# Patient Record
Sex: FEMALE | Race: WHITE | Employment: OTHER | URBAN - METROPOLITAN AREA
[De-identification: names, ages, dates, MRNs, and addresses within clinical notes are randomized per-mention and may not be internally consistent; named-entity substitution may affect disease eponyms.]

---

## 2017-01-30 ENCOUNTER — OFFICE VISIT (OUTPATIENT)
Dept: FAMILY MEDICINE CLINIC | Facility: CLINIC | Age: 77
End: 2017-01-30

## 2017-01-30 ENCOUNTER — HOSPITAL ENCOUNTER (OUTPATIENT)
Dept: GENERAL RADIOLOGY | Age: 77
Discharge: HOME | End: 2017-01-30
Attending: FAMILY MEDICINE
Payer: COMMERCIAL

## 2017-01-30 ENCOUNTER — HOSPITAL ENCOUNTER (OUTPATIENT)
Dept: CV DIAGNOSTICS | Age: 77
Discharge: HOME | End: 2017-01-30
Attending: FAMILY MEDICINE
Payer: COMMERCIAL

## 2017-01-30 ENCOUNTER — APPOINTMENT (OUTPATIENT)
Dept: LAB | Age: 77
End: 2017-01-30
Attending: FAMILY MEDICINE
Payer: COMMERCIAL

## 2017-01-30 VITALS
HEART RATE: 96 BPM | WEIGHT: 166.81 LBS | DIASTOLIC BLOOD PRESSURE: 72 MMHG | SYSTOLIC BLOOD PRESSURE: 130 MMHG | TEMPERATURE: 97 F | HEIGHT: 68 IN | BODY MASS INDEX: 25.28 KG/M2

## 2017-01-30 DIAGNOSIS — M54.50 ACUTE BILATERAL LOW BACK PAIN WITHOUT SCIATICA: ICD-10-CM

## 2017-01-30 DIAGNOSIS — F32.89 OTHER DEPRESSION: ICD-10-CM

## 2017-01-30 DIAGNOSIS — R55 VASOVAGAL SYNCOPE: Primary | ICD-10-CM

## 2017-01-30 DIAGNOSIS — E03.9 HYPOTHYROIDISM, UNSPECIFIED TYPE: ICD-10-CM

## 2017-01-30 DIAGNOSIS — R55 VASOVAGAL SYNCOPE: ICD-10-CM

## 2017-01-30 PROBLEM — A08.4 GASTROENTERITIS AND COLITIS, VIRAL: Status: ACTIVE | Noted: 2017-01-17

## 2017-01-30 LAB
ALBUMIN SERPL-MCNC: 3.7 G/DL (ref 3.5–4.8)
ALP LIVER SERPL-CCNC: 157 U/L (ref 55–142)
ALT SERPL-CCNC: 37 U/L (ref 14–54)
AST SERPL-CCNC: 20 U/L (ref 15–41)
BASOPHILS # BLD AUTO: 0.04 X10(3) UL (ref 0–0.1)
BASOPHILS NFR BLD AUTO: 0.6 %
BILIRUB SERPL-MCNC: 0.5 MG/DL (ref 0.1–2)
BILIRUB UR QL STRIP.AUTO: NEGATIVE
BUN BLD-MCNC: 15 MG/DL (ref 8–20)
CALCIUM BLD-MCNC: 9.2 MG/DL (ref 8.3–10.3)
CHLORIDE: 102 MMOL/L (ref 101–111)
CO2: 30 MMOL/L (ref 22–32)
COLOR UR AUTO: YELLOW
CREAT BLD-MCNC: 1.05 MG/DL (ref 0.55–1.02)
EOSINOPHIL # BLD AUTO: 0.06 X10(3) UL (ref 0–0.3)
EOSINOPHIL NFR BLD AUTO: 0.9 %
ERYTHROCYTE [DISTWIDTH] IN BLOOD BY AUTOMATED COUNT: 13.2 % (ref 11.5–16)
FREE T4: 0.9 NG/DL (ref 0.9–1.8)
GLUCOSE BLD-MCNC: 111 MG/DL (ref 70–99)
GLUCOSE UR STRIP.AUTO-MCNC: NEGATIVE MG/DL
HCT VFR BLD AUTO: 42.9 % (ref 34–50)
HGB BLD-MCNC: 13.6 G/DL (ref 12–16)
IMMATURE GRANULOCYTE COUNT: 0.02 X10(3) UL (ref 0–1)
IMMATURE GRANULOCYTE RATIO %: 0.3 %
KETONES UR STRIP.AUTO-MCNC: NEGATIVE MG/DL
LYMPHOCYTES # BLD AUTO: 2.32 X10(3) UL (ref 0.9–4)
LYMPHOCYTES NFR BLD AUTO: 35.7 %
M PROTEIN MFR SERPL ELPH: 7.6 G/DL (ref 6.1–8.3)
MCH RBC QN AUTO: 28.9 PG (ref 27–33.2)
MCHC RBC AUTO-ENTMCNC: 31.7 G/DL (ref 31–37)
MCV RBC AUTO: 91.1 FL (ref 81–100)
MONOCYTES # BLD AUTO: 0.38 X10(3) UL (ref 0.1–0.6)
MONOCYTES NFR BLD AUTO: 5.8 %
NEUTROPHIL ABS PRELIM: 3.68 X10 (3) UL (ref 1.3–6.7)
NEUTROPHILS # BLD AUTO: 3.68 X10(3) UL (ref 1.3–6.7)
NEUTROPHILS NFR BLD AUTO: 56.7 %
NITRITE UR QL STRIP.AUTO: POSITIVE
PH UR STRIP.AUTO: 5 [PH] (ref 4.5–8)
PLATELET # BLD AUTO: 266 10(3)UL (ref 150–450)
POTASSIUM SERPL-SCNC: 4.2 MMOL/L (ref 3.6–5.1)
PROT UR STRIP.AUTO-MCNC: NEGATIVE MG/DL
RBC # BLD AUTO: 4.71 X10(6)UL (ref 3.8–5.1)
RED CELL DISTRIBUTION WIDTH-SD: 43.8 FL (ref 35.1–46.3)
SODIUM SERPL-SCNC: 139 MMOL/L (ref 136–144)
SP GR UR STRIP.AUTO: 1.02 (ref 1–1.03)
TSI SER-ACNC: 6.91 MIU/ML (ref 0.35–5.5)
UROBILINOGEN UR STRIP.AUTO-MCNC: <2 MG/DL
WBC # BLD AUTO: 6.5 X10(3) UL (ref 4–13)

## 2017-01-30 PROCEDURE — 87184 SC STD DISK METHOD PER PLATE: CPT | Performed by: FAMILY MEDICINE

## 2017-01-30 PROCEDURE — 85025 COMPLETE CBC W/AUTO DIFF WBC: CPT | Performed by: FAMILY MEDICINE

## 2017-01-30 PROCEDURE — 93225 XTRNL ECG REC<48 HRS REC: CPT

## 2017-01-30 PROCEDURE — 87088 URINE BACTERIA CULTURE: CPT | Performed by: FAMILY MEDICINE

## 2017-01-30 PROCEDURE — 84443 ASSAY THYROID STIM HORMONE: CPT | Performed by: FAMILY MEDICINE

## 2017-01-30 PROCEDURE — 87186 SC STD MICRODIL/AGAR DIL: CPT | Performed by: FAMILY MEDICINE

## 2017-01-30 PROCEDURE — 84439 ASSAY OF FREE THYROXINE: CPT | Performed by: FAMILY MEDICINE

## 2017-01-30 PROCEDURE — 93000 ELECTROCARDIOGRAM COMPLETE: CPT | Performed by: FAMILY MEDICINE

## 2017-01-30 PROCEDURE — 87086 URINE CULTURE/COLONY COUNT: CPT | Performed by: FAMILY MEDICINE

## 2017-01-30 PROCEDURE — 93227 XTRNL ECG REC<48 HR R&I: CPT | Performed by: INTERNAL MEDICINE

## 2017-01-30 PROCEDURE — 80053 COMPREHEN METABOLIC PANEL: CPT | Performed by: FAMILY MEDICINE

## 2017-01-30 PROCEDURE — 99215 OFFICE O/P EST HI 40 MIN: CPT | Performed by: FAMILY MEDICINE

## 2017-01-30 PROCEDURE — 72110 X-RAY EXAM L-2 SPINE 4/>VWS: CPT

## 2017-01-30 PROCEDURE — 81001 URINALYSIS AUTO W/SCOPE: CPT | Performed by: FAMILY MEDICINE

## 2017-01-30 RX ORDER — AMOXICILLIN AND CLAVULANATE POTASSIUM 875; 125 MG/1; MG/1
TABLET, FILM COATED ORAL
Refills: 0 | COMMUNITY
Start: 2017-01-06 | End: 2017-03-03 | Stop reason: ALTCHOICE

## 2017-01-30 RX ORDER — MIRTAZAPINE 15 MG/1
15 TABLET, FILM COATED ORAL DAILY
COMMUNITY
Start: 2006-12-03 | End: 2017-05-08

## 2017-01-30 RX ORDER — CLONAZEPAM 0.5 MG/1
TABLET ORAL
COMMUNITY
Start: 2006-12-04 | End: 2017-05-15

## 2017-01-30 RX ORDER — LEVOTHYROXINE AND LIOTHYRONINE 19; 4.5 UG/1; UG/1
1 TABLET ORAL EVERY OTHER DAY
COMMUNITY
Start: 2016-11-30

## 2017-01-30 RX ORDER — LEVOTHYROXINE AND LIOTHYRONINE 38; 9 UG/1; UG/1
1 TABLET ORAL DAILY
COMMUNITY
Start: 2016-11-30

## 2017-01-30 RX ORDER — CIPROFLOXACIN 500 MG/1
TABLET, FILM COATED ORAL
Refills: 0 | COMMUNITY
Start: 2017-01-03 | End: 2017-01-30 | Stop reason: ALTCHOICE

## 2017-01-30 RX ORDER — BUSPIRONE HYDROCHLORIDE 10 MG/1
1 TABLET ORAL
COMMUNITY
Start: 2006-12-03 | End: 2017-02-07

## 2017-01-30 RX ORDER — BLACK COHOSH ROOT EXTRACT 80 MG
1 CAPSULE ORAL DAILY
COMMUNITY
Start: 2013-10-28

## 2017-01-30 RX ORDER — INFLUENZA A VIRUS A/MICHIGAN/45/2015 X-275 (H1N1) ANTIGEN (FORMALDEHYDE INACTIVATED), INFLUENZA A VIRUS A/HONG KONG/4801/2014 X-263B (H3N2) ANTIGEN (FORMALDEHYDE INACTIVATED), INFLUENZA B VIRUS B/PHUKET/3073/2013 ANTIGEN (FORMALDEHYDE INACTIVATED), AND INFLUENZA B VIRUS B/BRISBANE/60/2008 ANTIGEN (FORMALDEHYDE INACTIVATED) 15; 15; 15; 15 UG/.5ML; UG/.5ML; UG/.5ML; UG/.5ML
INJECTION, SUSPENSION INTRAMUSCULAR
Refills: 0 | COMMUNITY
Start: 2016-12-12

## 2017-01-30 NOTE — PATIENT INSTRUCTIONS
Decrease meds as instructed. Call [facility] for echocardiogram tomorrow. Call Dr. Chey Lopez to set up 30 day holter. 24 hour holitor today. Lumbar films. Cbc, cmp, ck, ua, tsh, t4.

## 2017-01-30 NOTE — H&P
Orthostatic Vitals:    Lyin/82     84  Sittin/66     88  Standin/60    88    Juliana Washington, 2017, 10:55 AM

## 2017-01-30 NOTE — PROGRESS NOTES
[facility] [place]  PROGRESS NOTE        HPI:   This is a 68year old female coming in for Patient presents with:  ER F/U: Fainting and unresponsive     HPI  Pt was driving with her   adn was ggetting up in the am to take thyroid medicati  had his breakfast, and wasn't watching as much,   Noted that she was pouring cereal than she usually used. She was trying to close the lid on the box.  And trying to pop it open, and the cereal was all over the counter, and pilbox was fill of cerea Diarrhea  Current Meds:    Current Outpatient Prescriptions:  BusPIRone HCl 10 MG Oral Tab Take 1 tablet by mouth TID with food. Disp:  Rfl:    ClonazePAM (KLONOPIN) 0.5 MG Oral Tab Take 0.5 tablets by mouth 2 (two) times daily.   Disp:  Rfl:    Lactobacil from the following:    Height as of this encounter: 68\". Weight as of this encounter: 166 lb 12.8 oz. Vital signs reviewed. Physical Exam   Constitutional: She appears well-developed and well-nourished. She appears lethargic.    HENT:   Head: Normoce [E]  - CMP  - TSH W Reflex To Free T4 [E]  - UA/M With Culture Reflex [E]  - CBC W/ DIFFERENTIAL; Future  - CBC W/ DIFFERENTIAL    2.  Acute bilateral low back pain without sciatica  X-rays positive for a lumbar compression fracture is also seen by radiolog this encounter     65 minutes spent in face to face counseling and coordination of care. Patients questions were answered and patient seemed comfortable with plan of care. Outcome: Parent verbalizes understanding.  Parent is notified to call with any

## 2017-01-31 ENCOUNTER — HOSPITAL ENCOUNTER (OUTPATIENT)
Dept: CV DIAGNOSTICS | Age: 77
Discharge: HOME | End: 2017-01-31
Attending: FAMILY MEDICINE
Payer: COMMERCIAL

## 2017-01-31 ENCOUNTER — TELEPHONE (OUTPATIENT)
Dept: FAMILY MEDICINE CLINIC | Facility: CLINIC | Age: 77
End: 2017-01-31

## 2017-01-31 DIAGNOSIS — E03.9 HYPOTHYROIDISM, UNSPECIFIED TYPE: Primary | ICD-10-CM

## 2017-01-31 RX ORDER — CEPHALEXIN 500 MG/1
500 CAPSULE ORAL 3 TIMES DAILY
Qty: 30 CAPSULE | Refills: 0 | Status: SHIPPED | OUTPATIENT
Start: 2017-01-31 | End: 2017-03-25 | Stop reason: ALTCHOICE

## 2017-01-31 RX ORDER — CEPHALEXIN 500 MG/1
500 CAPSULE ORAL 3 TIMES DAILY
COMMUNITY
End: 2017-01-31

## 2017-02-01 NOTE — TELEPHONE ENCOUNTER
----- Message from Josiane Robledo MD sent at 1/31/2017  4:51 PM CST -----  Start keflex 500 mg po tid. Advised to increase fluid intake. Call if increasing pain or vomitting. Cranberry concentrate to help change PH to decrease infection risk.   Void af

## 2017-02-01 NOTE — TELEPHONE ENCOUNTER
Patient and son Kaylie Adventism notified of results and recommendations. Keflex script to [facility] per patient request. [place] Thyroid meds updated in chart with current dose per son Kaylie Virk. Please advise on new dose of armour thyroid.  Erica Washington, 01/31/2017, 6:12 PM

## 2017-02-01 NOTE — TELEPHONE ENCOUNTER
Script sent to [email or web address]   Recommend   Continue 60 mg daily. 30 mg every other day. Recheck tsh in 8  Weeks.    Jessika Sosa, 01/31/2017, 10:05 PM

## 2017-02-03 ENCOUNTER — TELEPHONE (OUTPATIENT)
Dept: FAMILY MEDICINE CLINIC | Facility: CLINIC | Age: 77
End: 2017-02-03

## 2017-02-03 DIAGNOSIS — N39.0 URINARY TRACT INFECTION, SITE UNSPECIFIED: Primary | ICD-10-CM

## 2017-02-03 NOTE — TELEPHONE ENCOUNTER
Patient and her  notified of results and recommendations and expressed understanding. Order in for recheck of urine.  Pio Nevaeh Felix, 02/03/2017, 2:23 PM

## 2017-02-03 NOTE — TELEPHONE ENCOUNTER
Patient and her  advised of Dr. Aron Patel recommendation on the [place] Thyroid. They both expressed understanding. Med updated in chart. No refills needed at this time. Order in for recheck of bloodwork.  Jaxon Camara Minor, 02/03/2017, 2:18 PM

## 2017-02-03 NOTE — TELEPHONE ENCOUNTER
----- Message from Kong Alegre MD sent at 2/2/2017  4:27 PM CST -----  keflfex should cover. Advised to increase fluid intake. Call if increasing pain or vomitting. Cranberry concentrate to help change PH to decrease infection risk.   Void after int

## 2017-02-06 ENCOUNTER — TELEPHONE (OUTPATIENT)
Dept: FAMILY MEDICINE CLINIC | Facility: CLINIC | Age: 77
End: 2017-02-06

## 2017-02-07 RX ORDER — BUSPIRONE HYDROCHLORIDE 10 MG/1
10 TABLET ORAL 3 TIMES DAILY
Qty: 90 TABLET | Refills: 1 | Status: SHIPPED | OUTPATIENT
Start: 2017-02-07 | End: 2017-04-11

## 2017-02-07 NOTE — TELEPHONE ENCOUNTER
----- Message from Michelle Gaston MD sent at 2/6/2017  3:02 PM CST -----  This actually looks pretty good. How is she feeling? Would like her to follow up early next week to recheck on her.

## 2017-02-07 NOTE — TELEPHONE ENCOUNTER
Regarding Holter Monitor. Patient notified of results and recommendations and expressed understanding. States she has had nausea since starting keflex but no vomiting or diarrhea. Otherwise feels good.  Has f/up appt scheduled with Dr. Renny Bailey for next Tuesday

## 2017-02-14 ENCOUNTER — OFFICE VISIT (OUTPATIENT)
Dept: FAMILY MEDICINE CLINIC | Facility: CLINIC | Age: 77
End: 2017-02-14

## 2017-02-14 VITALS
BODY MASS INDEX: 25.71 KG/M2 | DIASTOLIC BLOOD PRESSURE: 70 MMHG | SYSTOLIC BLOOD PRESSURE: 132 MMHG | HEIGHT: 68 IN | WEIGHT: 169.63 LBS

## 2017-02-14 DIAGNOSIS — F32.89 OTHER DEPRESSION: ICD-10-CM

## 2017-02-14 DIAGNOSIS — F41.1 ANXIETY STATE: ICD-10-CM

## 2017-02-14 DIAGNOSIS — N39.0 URINARY TRACT INFECTION, SITE UNSPECIFIED: Primary | ICD-10-CM

## 2017-02-14 LAB
BILIRUB UR QL STRIP.AUTO: NEGATIVE
CLARITY UR REFRACT.AUTO: CLEAR
COLOR UR AUTO: COLORLESS
GLUCOSE UR STRIP.AUTO-MCNC: NEGATIVE MG/DL
HYALINE CASTS #/AREA URNS AUTO: PRESENT /LPF
KETONES UR STRIP.AUTO-MCNC: NEGATIVE MG/DL
LEUKOCYTE ESTERASE UR QL STRIP.AUTO: NEGATIVE
NITRITE UR QL STRIP.AUTO: NEGATIVE
PH UR STRIP.AUTO: 6 [PH] (ref 4.5–8)
PROT UR STRIP.AUTO-MCNC: NEGATIVE MG/DL
SP GR UR STRIP.AUTO: <1.005 (ref 1–1.03)
UROBILINOGEN UR STRIP.AUTO-MCNC: <2 MG/DL

## 2017-02-14 PROCEDURE — 87086 URINE CULTURE/COLONY COUNT: CPT | Performed by: FAMILY MEDICINE

## 2017-02-14 PROCEDURE — 81001 URINALYSIS AUTO W/SCOPE: CPT | Performed by: FAMILY MEDICINE

## 2017-02-14 PROCEDURE — 99214 OFFICE O/P EST MOD 30 MIN: CPT | Performed by: FAMILY MEDICINE

## 2017-02-14 NOTE — PROGRESS NOTES
[facility] [place]  PROGRESS NOTE        HPI:   This is a 68year old female coming in for Patient presents with: Follow - Up    HPI  Pt with back pain that is 6/10. Using heat. Tylenol helps a bit 2/10. Used heat.   States sometimes forgets and brother  with Factor XI, deficiency,  advised to test and for factor VIII. Smoking Status: Never Smoker                      Alcohol Use: No              Family History:  History reviewed. No pertinent family history.   Allergies:    Alprazolam in soft tissues of limb     Cataract     Benign neoplasm of colon     Abnormal cystoscopy     Depression     Disorder of lipid metabolism     Gastroenteritis and colitis, viral     Esophageal reflux     Hypothyroidism     Disorder of bone and cartilage again       Follow up in 1 month.   tsh in n8 week. Peroxide to ears. Fish oili dialy. Meds & Refills for this Visit:  No prescriptions requested or ordered in this encounter    Outcome: Parent verbalizes understanding.  Parent is notified to call

## 2017-02-16 ENCOUNTER — TELEPHONE (OUTPATIENT)
Dept: FAMILY MEDICINE CLINIC | Facility: CLINIC | Age: 77
End: 2017-02-16

## 2017-02-16 NOTE — TELEPHONE ENCOUNTER
----- Message from Graciela Ugalde MD sent at 2/15/2017  8:53 PM CST -----  Urine looks much better and culture currently negative. Will notify if it grows anything in next few days. Otherwise nothing more from urine standpoint to do. Kiko Gorman

## 2017-02-16 NOTE — TELEPHONE ENCOUNTER
Patient notified of results and recommendations and expressed understanding.     Pooja Washington, 02/16/2017, 9:19 AM

## 2017-03-02 ENCOUNTER — TELEPHONE (OUTPATIENT)
Dept: FAMILY MEDICINE CLINIC | Facility: CLINIC | Age: 77
End: 2017-03-02

## 2017-03-03 NOTE — TELEPHONE ENCOUNTER
Patient notified of recommendations and expressed understanding  Patient has appt on 4/3/17 with Dr. Robby Davenport to f/up  Advised patient we can draw thyroid labs that day at appt  Patient expressed understanding  Removed erroneous urine orders    Luis Washington, 0

## 2017-03-03 NOTE — TELEPHONE ENCOUNTER
Pt was suppose to increase her synthroid and repeat TSH in 8 weeks. Please resolve extra order for urine in orders. No need for labs at this time. Unless symptoms.        Your appointments     Date & Time Appointment Department [facility])    Monday April

## 2017-03-25 ENCOUNTER — OFFICE VISIT (OUTPATIENT)
Dept: FAMILY MEDICINE CLINIC | Facility: CLINIC | Age: 77
End: 2017-03-25

## 2017-03-25 VITALS
TEMPERATURE: 98 F | BODY MASS INDEX: 26 KG/M2 | WEIGHT: 168.81 LBS | SYSTOLIC BLOOD PRESSURE: 144 MMHG | DIASTOLIC BLOOD PRESSURE: 78 MMHG | HEART RATE: 64 BPM

## 2017-03-25 DIAGNOSIS — N30.00 ACUTE CYSTITIS WITHOUT HEMATURIA: ICD-10-CM

## 2017-03-25 DIAGNOSIS — R30.9 PAINFUL URINATION: Primary | ICD-10-CM

## 2017-03-25 LAB
APPEARANCE: CLEAR
BILIRUB UR QL STRIP.AUTO: NEGATIVE
BILIRUBIN: NEGATIVE
CLARITY UR REFRACT.AUTO: CLEAR
COLOR UR AUTO: YELLOW
GLUCOSE (URINE DIPSTICK): NEGATIVE MG/DL
GLUCOSE UR STRIP.AUTO-MCNC: NEGATIVE MG/DL
KETONES (URINE DIPSTICK): NEGATIVE MG/DL
KETONES UR STRIP.AUTO-MCNC: NEGATIVE MG/DL
MULTISTIX LOT#: NORMAL NUMERIC
NITRITE UR QL STRIP.AUTO: POSITIVE
NITRITE, URINE: POSITIVE
PH UR STRIP.AUTO: 7 [PH] (ref 4.5–8)
PH, URINE: 7 (ref 4.5–8)
PROT UR STRIP.AUTO-MCNC: NEGATIVE MG/DL
PROTEIN (URINE DIPSTICK): NEGATIVE MG/DL
SP GR UR STRIP.AUTO: 1.01 (ref 1–1.03)
SPECIFIC GRAVITY: 1.01 (ref 1–1.03)
URINE-COLOR: YELLOW
UROBILINOGEN UR STRIP.AUTO-MCNC: <2 MG/DL
UROBILINOGEN,SEMI-QN: 0.2 MG/DL (ref 0–1.9)

## 2017-03-25 PROCEDURE — 87086 URINE CULTURE/COLONY COUNT: CPT | Performed by: NURSE PRACTITIONER

## 2017-03-25 PROCEDURE — 99214 OFFICE O/P EST MOD 30 MIN: CPT | Performed by: NURSE PRACTITIONER

## 2017-03-25 PROCEDURE — 81001 URINALYSIS AUTO W/SCOPE: CPT | Performed by: NURSE PRACTITIONER

## 2017-03-25 PROCEDURE — 87088 URINE BACTERIA CULTURE: CPT | Performed by: NURSE PRACTITIONER

## 2017-03-25 PROCEDURE — 87186 SC STD MICRODIL/AGAR DIL: CPT | Performed by: NURSE PRACTITIONER

## 2017-03-25 RX ORDER — CEPHALEXIN 500 MG/1
500 CAPSULE ORAL 3 TIMES DAILY
Qty: 30 CAPSULE | Refills: 0 | Status: SHIPPED | OUTPATIENT
Start: 2017-03-25 | End: 2017-04-04

## 2017-03-25 NOTE — PATIENT INSTRUCTIONS
Urinary tract infection cause and prevention discussed. Drink more water, don't hold urine, urinate after intercourse, don't take bubble baths or use scented soaps, don't use powders, keep bowels regular.  Follow up if symptoms persist or if you experience

## 2017-03-25 NOTE — PROGRESS NOTES
Patient ID:   Ana Nails  is a 68year old female    Allergies    Alprazolam                  Comment:Other reaction(s): felt strange, and withdrawl             from stopping.   Avelox  [Moxifloxac*    Nausea only  Levofloxacin            Nausea only appetite  SKIN:denies unusual skin lesions or rashes  HEENT: denies complaints   NECK/THYROID: denies stiffness or pain. LUNGS: denies cough, shortness of breath, or wheezing.   GENITOURINARY: see HPI   GASTROINTESTINAL: Denies constipation diarrhea change

## 2017-03-27 ENCOUNTER — TELEPHONE (OUTPATIENT)
Dept: FAMILY MEDICINE CLINIC | Facility: CLINIC | Age: 77
End: 2017-03-27

## 2017-03-27 NOTE — TELEPHONE ENCOUNTER
----- Message from NEELA Stacy sent at 3/27/2017  8:09 AM CDT -----  Please have pt continue keflex as this bacteria is sensitive to keflex. Please assess how pt is doing.

## 2017-04-03 ENCOUNTER — TELEPHONE (OUTPATIENT)
Dept: FAMILY MEDICINE CLINIC | Facility: CLINIC | Age: 77
End: 2017-04-03

## 2017-04-03 ENCOUNTER — OFFICE VISIT (OUTPATIENT)
Dept: FAMILY MEDICINE CLINIC | Facility: CLINIC | Age: 77
End: 2017-04-03

## 2017-04-03 VITALS
SYSTOLIC BLOOD PRESSURE: 152 MMHG | HEIGHT: 68 IN | BODY MASS INDEX: 25.31 KG/M2 | RESPIRATION RATE: 20 BRPM | HEART RATE: 96 BPM | TEMPERATURE: 98 F | WEIGHT: 167 LBS | DIASTOLIC BLOOD PRESSURE: 78 MMHG

## 2017-04-03 DIAGNOSIS — N39.0 URINARY TRACT INFECTION WITHOUT HEMATURIA, SITE UNSPECIFIED: Primary | ICD-10-CM

## 2017-04-03 DIAGNOSIS — M25.511 CHRONIC RIGHT SHOULDER PAIN: ICD-10-CM

## 2017-04-03 DIAGNOSIS — G89.29 CHRONIC RIGHT SHOULDER PAIN: ICD-10-CM

## 2017-04-03 DIAGNOSIS — E03.9 HYPOTHYROIDISM (ACQUIRED): ICD-10-CM

## 2017-04-03 PROCEDURE — 99214 OFFICE O/P EST MOD 30 MIN: CPT | Performed by: FAMILY MEDICINE

## 2017-04-03 RX ORDER — PAROXETINE 10 MG/1
10 TABLET, FILM COATED ORAL EVERY MORNING
Qty: 30 TABLET | Refills: 3 | Status: SHIPPED | OUTPATIENT
Start: 2017-04-03 | End: 2017-04-25

## 2017-04-03 NOTE — TELEPHONE ENCOUNTER
Patient would like referral to another counselor  Grace Felty (here at [facility]) does not accept Medicare    Bobbi Washington, 04/03/2017, 5:34 PM

## 2017-04-03 NOTE — PROGRESS NOTES
[place] [facility] [place]  PROGRESS NOTE        HPI:   This is a 68year old female coming in for Patient presents with:  Thyroid Problem: started on medication for thyroid, here for a medication follow up    HPI  Pt states she thinks she is doing rath -URINALYSIS WITH CULTURE REFLEX   Result Value Ref Range   Urine Color Yellow Yellow   Clarity Urine Clear Clear   Spec Gravity 1.006 1.001-1.030   Glucose Urine Negative Negative mg/dl   Bilirubin Urine Negative Negative   Ketones Urine Negative Negativ Comment:Other reaction(s): felt strange, and withdrawl             from stopping. Avelox  [Moxifloxac*    Nausea only  Augmentin [Amoxicil*    Diarrhea  Levofloxacin            Nausea only    Comment:Other reaction(s):  Other (See Comments Constitutional: Negative. HENT: Negative. Eyes: Negative. Respiratory: Negative. Cardiovascular: Negative. Gastrointestinal: Negative. Endocrine: Negative.     Genitourinary: Negative for dysuria, flank pain, enuresis and difficulty urin Future    Chronic right shoulder pain  -     Physical Therapy Referral - External    Hypothyroidism (acquired)  -     TSH W Reflex To Free T4 [E]; Future  -     Triiodothyronine (T3) Total [E];  Future    Other orders  -     PARoxetine HCl (PAXIL) 10 MG Ora

## 2017-04-04 ENCOUNTER — TELEPHONE (OUTPATIENT)
Dept: FAMILY MEDICINE CLINIC | Facility: CLINIC | Age: 77
End: 2017-04-04

## 2017-04-04 NOTE — TELEPHONE ENCOUNTER
Patient notified of recommendation and given contact number to make appt  Patient expressed understanding    Jaxon Washington, 04/04/2017, 1:17 PM

## 2017-04-04 NOTE — TELEPHONE ENCOUNTER
Prior auth started with insurance for paroxetine  Currently in review  Reference #[id number]  [facility] and patient notified  Per pharm should receive notification in 48 hours    Juliana Washington, 04/04/2017, 1:43 PM

## 2017-04-07 ENCOUNTER — APPOINTMENT (OUTPATIENT)
Dept: LAB | Age: 77
End: 2017-04-07
Attending: FAMILY MEDICINE
Payer: MEDICARE

## 2017-04-07 ENCOUNTER — TELEPHONE (OUTPATIENT)
Dept: FAMILY MEDICINE CLINIC | Facility: CLINIC | Age: 77
End: 2017-04-07

## 2017-04-07 DIAGNOSIS — R19.7 DIARRHEA, UNSPECIFIED TYPE: Primary | ICD-10-CM

## 2017-04-07 DIAGNOSIS — N39.0 URINARY TRACT INFECTION WITHOUT HEMATURIA, SITE UNSPECIFIED: ICD-10-CM

## 2017-04-07 DIAGNOSIS — E03.9 HYPOTHYROIDISM, UNSPECIFIED TYPE: ICD-10-CM

## 2017-04-07 PROCEDURE — 81001 URINALYSIS AUTO W/SCOPE: CPT

## 2017-04-07 PROCEDURE — 87086 URINE CULTURE/COLONY COUNT: CPT

## 2017-04-07 NOTE — TELEPHONE ENCOUNTER
Patient informed of the below recommendations. Patient will  a stool at our office. Orders for C. Diff entered and pended. Please sign. Patient will contact [facility] to see if they can tell her of another medication that would be covered.

## 2017-04-07 NOTE — TELEPHONE ENCOUNTER
Patient states she was diagnosed with a UTI and was prescribed Keflex by Dr. Tasha Arreola. States since Monday evening she has been having diarrhea 2-3 times per day. Wondering if there is something she can take for this?      Also states she when she went to pic

## 2017-04-07 NOTE — TELEPHONE ENCOUNTER
Start probitoic today. Recommend stool culture for c diff. Recommend call lehans and see what insurance will pay for.    NoWait, 04/07/2017, 2:02 PM

## 2017-04-08 ENCOUNTER — TELEPHONE (OUTPATIENT)
Dept: FAMILY MEDICINE CLINIC | Facility: CLINIC | Age: 77
End: 2017-04-08

## 2017-04-08 NOTE — TELEPHONE ENCOUNTER
----- Message from Sienna Tello MD sent at 4/7/2017  6:47 PM CDT -----  Urine looks much better. Continue present managment   Frequent voiding.

## 2017-04-08 NOTE — TELEPHONE ENCOUNTER
Prior Auth was started for the paxil - see last phone note  Prior Auth pending  Both patient and pharmacy aware we are waiting on prior auth  Stool orders faxed to [facility] patient scheduling    Natali Patient Minor, 04/08/2017, 8:13 AM

## 2017-04-08 NOTE — TELEPHONE ENCOUNTER
Spoke with patient regarding urine results, patient informed of below. Patient verbalized understanding.

## 2017-04-08 NOTE — TELEPHONE ENCOUNTER
Patient notified that we have not received notification from insurance on paxil prior auth  However our fax machine has not been working properly this last week and unsure if we received notification from insurance  Patient states she called to [facility] yes

## 2017-04-10 ENCOUNTER — TELEPHONE (OUTPATIENT)
Dept: FAMILY MEDICINE CLINIC | Facility: CLINIC | Age: 77
End: 2017-04-10

## 2017-04-10 RX ORDER — METRONIDAZOLE 500 MG/1
500 TABLET ORAL 2 TIMES DAILY
Qty: 20 TABLET | Refills: 0 | Status: SHIPPED | OUTPATIENT
Start: 2017-04-10 | End: 2017-04-25 | Stop reason: ALTCHOICE

## 2017-04-10 RX ORDER — METRONIDAZOLE 500 MG/1
500 TABLET ORAL 2 TIMES DAILY
Qty: 20 TABLET | Refills: 0 | Status: SHIPPED | OUTPATIENT
Start: 2017-04-10 | End: 2017-04-10

## 2017-04-10 NOTE — TELEPHONE ENCOUNTER
Start on flagyl. Start probiotic bid   Follow up appt. In 2 weeks.    TORRES DINERO, 04/10/2017, 1:29 PM

## 2017-04-10 NOTE — TELEPHONE ENCOUNTER
Rakesh gallegos from [facility] lab  States patient's stool sample positive for C.  Diff  Lab result also faxed  Lab report to Dr. Mayco Washington, 04/10/2017, 11:14 AM

## 2017-04-10 NOTE — TELEPHONE ENCOUNTER
Duplicate phone note  See other phone note 4/10/17 for further documentation    Andersonjosé miguel Katey Washington, 04/10/2017, 1:40 PM

## 2017-04-10 NOTE — TELEPHONE ENCOUNTER
Patient is aware  Phone note 4/10/17  Patient's flagyl script was sent to [facility]  Patient aware she needs 2 week f/up appt    Inna Washington, 04/10/2017, 2:30 PM

## 2017-04-10 NOTE — TELEPHONE ENCOUNTER
Patient notified of results and recommendations and expressed understanding.   Script to [facility] per patient request    Stephany Washington, 04/10/2017, 2:02 PM

## 2017-04-10 NOTE — TELEPHONE ENCOUNTER
----- Message from Sterling Jurado MD sent at 4/10/2017  1:42 PM CDT -----  UTI is resolved. Continue to increase water, and azocran. Follow up in 2 weeks after flagyl.

## 2017-04-11 ENCOUNTER — OFFICE VISIT (OUTPATIENT)
Dept: FAMILY MEDICINE CLINIC | Facility: CLINIC | Age: 77
End: 2017-04-11

## 2017-04-11 ENCOUNTER — TELEPHONE (OUTPATIENT)
Dept: FAMILY MEDICINE CLINIC | Facility: CLINIC | Age: 77
End: 2017-04-11

## 2017-04-11 VITALS
HEART RATE: 100 BPM | HEIGHT: 68 IN | SYSTOLIC BLOOD PRESSURE: 138 MMHG | WEIGHT: 165.63 LBS | BODY MASS INDEX: 25.1 KG/M2 | RESPIRATION RATE: 16 BRPM | TEMPERATURE: 98 F | DIASTOLIC BLOOD PRESSURE: 76 MMHG

## 2017-04-11 DIAGNOSIS — M25.511 CHRONIC RIGHT SHOULDER PAIN: Primary | ICD-10-CM

## 2017-04-11 DIAGNOSIS — G89.29 CHRONIC RIGHT SHOULDER PAIN: Primary | ICD-10-CM

## 2017-04-11 PROCEDURE — 99213 OFFICE O/P EST LOW 20 MIN: CPT | Performed by: NURSE PRACTITIONER

## 2017-04-11 RX ORDER — BUSPIRONE HYDROCHLORIDE 10 MG/1
TABLET ORAL
Qty: 90 TABLET | Refills: 1 | Status: SHIPPED | OUTPATIENT
Start: 2017-04-11 | End: 2017-06-09

## 2017-04-11 NOTE — TELEPHONE ENCOUNTER
Prior auth for Paxil was denied  Patient needs to try and fail either bupropion or sertraline  [facility] needs new script  Please advise    Abiola Washington, 04/11/2017, 2:51 PM

## 2017-04-11 NOTE — PATIENT INSTRUCTIONS
Overall exam within normal limits. Recommend doing physical therapy, if no improvement with physical therapy, consider ortho referral.  Stay hydrated and continue flagyl for c. Diff infection. Keep regular scheduled f/u appt with Dr. Lazaro Michel.   Return soone

## 2017-04-11 NOTE — PROGRESS NOTES
Leora Trujillo is a 68year old female. Chief Complaint:  Patient presents with:  Arm Pain: Right side arm pit pain and arm pain. Hurting worse than before, saw Dr. Renny Bailey 2 weeks ago for shoulder pain same side.      HPI:   Patient presents to offic (THREE) TIMES DAILY. Disp: 90 tablet Rfl: 1   metRONIDAZOLE (FLAGYL) 500 MG Oral Tab Take 1 tablet (500 mg total) by mouth 2 (two) times daily. Disp: 20 tablet Rfl: 0   ClonazePAM (KLONOPIN) 0.5 MG Oral Tab Take 0.5 tablets by mouth every evening.   Disp: landmarks present, posterior pharynx pink, no exudates. Nares patent, nasal mucosa pink and nonedematous.   NECK: supple, no adenopathy, no bruits  LUNGS: clear to auscultation, no wheezing, rhonchi, crackles  CARDIO: RRR, S1 and S2 heard, without murmur not helping we will consider Ortho referral.  Offered patient ortho referral now, but  patient states that she would like to first try physical therapy again and then may consider orthopedic referral.    Patient voices understanding and agrees with plan of

## 2017-04-17 RX ORDER — SERTRALINE HYDROCHLORIDE 25 MG/1
25 TABLET, FILM COATED ORAL DAILY
Qty: 30 TABLET | Refills: 2 | Status: SHIPPED | OUTPATIENT
Start: 2017-04-17 | End: 2017-04-25 | Stop reason: SINTOL

## 2017-04-24 ENCOUNTER — TELEPHONE (OUTPATIENT)
Dept: FAMILY MEDICINE CLINIC | Facility: CLINIC | Age: 77
End: 2017-04-24

## 2017-04-25 ENCOUNTER — TELEPHONE (OUTPATIENT)
Dept: FAMILY MEDICINE CLINIC | Facility: CLINIC | Age: 77
End: 2017-04-25

## 2017-04-25 NOTE — TELEPHONE ENCOUNTER
Discussed with Chelsea Hoffman had tried to prescribe paxil last month but insurance would not cover  Paxil was replaced by zoloft per insurance requirements  Patient in to see Dr. Harshad Hoffman today  Patient had to stop zoloft due to side effects  Dr. Yohana Andrew

## 2017-04-25 NOTE — PROGRESS NOTES
[facility] [place]  PROGRESS NOTE        HPI:   This is a 68year old female coming in for Patient presents with:  Medication Follow-Up: Patient has stopped the sertraline per Dr. Ulysses Schneider    HPI   stopped the sertraline as she didn't think it a 52.     Mother is  age 80 from  Heart disease. Other family history includes: 2 Nieces and brother  with Factor XI, deficiency,  advised to test and for factor VIII. Father is . He  of pancreatic cancer at the age of 52. Take 81 mg by mouth daily.  Disp:  Rfl:    FLUZONE QUADRIVALENT 0.5 ML Intramuscular Suspension Prefilled Syringe  Disp:  Rfl: 0      Counseling given: Not Answered         Problem List:  Patient Active Problem List:     Allergic rhinitis     Anxiety state Neck: Normal range of motion. Neck supple. Cardiovascular: Normal rate, regular rhythm and normal heart sounds. Pulmonary/Chest: Breath sounds normal.   Abdominal: Soft. Bowel sounds are normal. She exhibits no distension.    Musculoskeletal: Normal file for this patient.

## 2017-05-01 ENCOUNTER — TELEPHONE (OUTPATIENT)
Dept: FAMILY MEDICINE CLINIC | Facility: CLINIC | Age: 77
End: 2017-05-01

## 2017-05-01 NOTE — TELEPHONE ENCOUNTER
Patient ID #[id number]  Call to [facility] for prior auth for Paroxetine  Patient tried and failed sertraline    Spoke to Gurdeep at [address]  that patient tried and failed the sertraline and now we would like to get paroxetine approved  Was informed by [facility] desk  At this point I have been on the phone for 40 minutes with no resolution    [place] called to [address]  Paroxetine at [address] is $4 a month maldonado    Michelle Washington, 05/01/2017, 12:04 PM

## 2017-05-01 NOTE — TELEPHONE ENCOUNTER
Transfer script to [address] [place] [address] willing to pay for script. Bernadine Garibay, 05/01/2017, 1:31 PM

## 2017-05-01 NOTE — TELEPHONE ENCOUNTER
Discussed with patient  Patient states she went to see someone on Friday 4/28/17  Patient cannot remember who she saw or what the name of the place was  States it was \"[place] area by Dr. Theotis Mcburney office\"  States she was prescribed Lexapro 10 mg

## 2017-05-02 ENCOUNTER — TELEPHONE (OUTPATIENT)
Dept: FAMILY MEDICINE CLINIC | Facility: CLINIC | Age: 77
End: 2017-05-02

## 2017-05-02 NOTE — TELEPHONE ENCOUNTER
Spoke to Jigna's daughter in law Bhumika Abel is going to [place] for counseling  Saw Henna Bautista who is PA at [facility]  This is who prescribed the Lexapro for Francia Gutiérrez just wanted to call and let Dr. Renny Bailey know  I spo

## 2017-05-08 RX ORDER — MIRTAZAPINE 15 MG/1
TABLET, FILM COATED ORAL
Qty: 90 TABLET | Refills: 1 | Status: SHIPPED | OUTPATIENT
Start: 2017-05-08 | End: 2017-05-12

## 2017-05-12 ENCOUNTER — APPOINTMENT (OUTPATIENT)
Dept: LAB | Age: 77
End: 2017-05-12
Attending: FAMILY MEDICINE
Payer: MEDICARE

## 2017-05-12 ENCOUNTER — OFFICE VISIT (OUTPATIENT)
Dept: FAMILY MEDICINE CLINIC | Facility: CLINIC | Age: 77
End: 2017-05-12

## 2017-05-12 VITALS
RESPIRATION RATE: 16 BRPM | WEIGHT: 161 LBS | HEIGHT: 68 IN | HEART RATE: 64 BPM | SYSTOLIC BLOOD PRESSURE: 144 MMHG | TEMPERATURE: 97 F | BODY MASS INDEX: 24.4 KG/M2 | DIASTOLIC BLOOD PRESSURE: 78 MMHG

## 2017-05-12 DIAGNOSIS — F41.1 ANXIETY STATE: ICD-10-CM

## 2017-05-12 DIAGNOSIS — E03.9 HYPOTHYROIDISM, UNSPECIFIED TYPE: Primary | ICD-10-CM

## 2017-05-12 PROCEDURE — 99214 OFFICE O/P EST MOD 30 MIN: CPT | Performed by: FAMILY MEDICINE

## 2017-05-12 PROCEDURE — 84443 ASSAY THYROID STIM HORMONE: CPT

## 2017-05-12 RX ORDER — ESCITALOPRAM OXALATE 10 MG/1
1 TABLET ORAL DAILY
Refills: 1 | COMMUNITY
Start: 2017-04-28

## 2017-05-12 NOTE — PROGRESS NOTES
[facility] [place]  PROGRESS NOTE        HPI:   This is a 68year old female coming in for Patient presents with: Follow - Up: Medication f/u, would also like thyroid checked    HPI    Patient here with  and daughter.    Currently taking Transitional Cells None Seen Small /LPF   Hyaline Casts Present (A) None Seen /LPF   Mucous Urine 1+ (A) None Seen   Yeast Urine None Seen None Seen   -URINE CULTURE, ROUTINE   Result Value Ref Range   Urine Culture <10,000 CFU/ML Mixed Mary        Past day  Disp:  Rfl:    Lactobacillus (ACIDOPHILUS PROBIOTIC) 100 MG Oral Cap Take 1 capsule by mouth daily. Disp:  Rfl:    thyroid (ARMOUR THYROID) 30 MG Oral Tab Take 1 tablet by mouth every other day.   Disp:  Rfl:    thyroid (ARMOUR THYROID) 60 MG Oral Ta Pupils are equal, round, and reactive to light. Neck: Normal range of motion. Neck supple. Cardiovascular: Normal rate, regular rhythm and normal heart sounds. Pulmonary/Chest: Breath sounds normal.   Abdominal: Soft.  Bowel sounds are normal. She ex

## 2017-05-15 ENCOUNTER — TELEPHONE (OUTPATIENT)
Dept: FAMILY MEDICINE CLINIC | Facility: CLINIC | Age: 77
End: 2017-05-15

## 2017-05-15 DIAGNOSIS — E03.9 HYPOTHYROIDISM, UNSPECIFIED TYPE: Primary | ICD-10-CM

## 2017-05-15 RX ORDER — CLONAZEPAM 0.5 MG/1
0.25 TABLET ORAL 2 TIMES DAILY PRN
Qty: 30 TABLET | Refills: 0 | Status: SHIPPED
Start: 2017-05-15 | End: 2017-06-19

## 2017-05-15 NOTE — TELEPHONE ENCOUNTER
Patient notified of results and recommendations and expressed understanding.   Orders in for repeat bloodwork    Ochoa Washington, 05/15/2017, 11:13 AM

## 2017-05-15 NOTE — TELEPHONE ENCOUNTER
----- Message from oSha Day MD sent at 5/13/2017  7:39 AM CDT -----  This dose of thyroid medication seems to be working.    Recheck in 6 months with TSH, T4, and T3.

## 2017-05-17 ENCOUNTER — OFFICE VISIT (OUTPATIENT)
Dept: FAMILY MEDICINE CLINIC | Facility: CLINIC | Age: 77
End: 2017-05-17

## 2017-05-17 VITALS
WEIGHT: 163 LBS | RESPIRATION RATE: 16 BRPM | SYSTOLIC BLOOD PRESSURE: 138 MMHG | TEMPERATURE: 98 F | HEART RATE: 82 BPM | HEIGHT: 65 IN | DIASTOLIC BLOOD PRESSURE: 78 MMHG | BODY MASS INDEX: 27.16 KG/M2

## 2017-05-17 DIAGNOSIS — R35.0 URINARY FREQUENCY: Primary | ICD-10-CM

## 2017-05-17 DIAGNOSIS — N30.00 ACUTE CYSTITIS WITHOUT HEMATURIA: ICD-10-CM

## 2017-05-17 PROCEDURE — 81003 URINALYSIS AUTO W/O SCOPE: CPT | Performed by: FAMILY MEDICINE

## 2017-05-17 PROCEDURE — 87088 URINE BACTERIA CULTURE: CPT | Performed by: FAMILY MEDICINE

## 2017-05-17 PROCEDURE — 87186 SC STD MICRODIL/AGAR DIL: CPT | Performed by: FAMILY MEDICINE

## 2017-05-17 PROCEDURE — 87086 URINE CULTURE/COLONY COUNT: CPT | Performed by: FAMILY MEDICINE

## 2017-05-17 PROCEDURE — 99213 OFFICE O/P EST LOW 20 MIN: CPT | Performed by: FAMILY MEDICINE

## 2017-05-17 RX ORDER — CEPHALEXIN 500 MG/1
500 CAPSULE ORAL 3 TIMES DAILY
Qty: 21 CAPSULE | Refills: 0 | Status: SHIPPED | OUTPATIENT
Start: 2017-05-17 | End: 2017-05-21

## 2017-05-17 NOTE — PROGRESS NOTES
Chief Complaint:   Patient presents with:  Urinary Frequency: Going a lot more frequent, and feels a little pressure      HPI:   This is a 68year old female coming in for urinary frequency and discomfort which began last week.   Patient with a history of u (500 mg total) by mouth 3 (three) times daily. Disp: 21 capsule Rfl: 0   ClonazePAM (KLONOPIN) 0.5 MG Oral Tab Take 0.5 tablets (0.25 mg total) by mouth 2 (two) times daily as needed for Anxiety.  Disp: 30 tablet Rfl: 0   escitalopram 10 MG Oral Tab Take 1 Denies headache, dizziness,   HEMATOLOGIC:  Denies bleeding or bruising. PSYCHIATRIC:  Denies depression or anxiety.   ENDOCRINOLOGIC:  Denies cold or heat intolerance,   ALLERGIES:  Denies allergic response,    EXAM:   /78 mmHg  Pulse 82  Temp(Src) Esophageal reflux     Hypothyroidism     Disorder of bone and cartilage     Other nonspecific abnormal finding of lung field     Solitary pulmonary nodule     Urinary tract infection      Beatrzi Beaver MD  5/17/2017  4:26 PM

## 2017-05-21 ENCOUNTER — TELEPHONE (OUTPATIENT)
Dept: FAMILY MEDICINE CLINIC | Facility: CLINIC | Age: 77
End: 2017-05-21

## 2017-05-21 RX ORDER — SULFAMETHOXAZOLE AND TRIMETHOPRIM 800; 160 MG/1; MG/1
1 TABLET ORAL 2 TIMES DAILY
Qty: 20 TABLET | Refills: 0 | Status: SHIPPED | OUTPATIENT
Start: 2017-05-21 | End: 2017-05-31

## 2017-05-21 NOTE — TELEPHONE ENCOUNTER
Daughter Reta Dennis called - wondered about UTI test results. Results noted but no antibiotic called in. Will send to [facility] today because it is Sunday.

## 2017-05-22 ENCOUNTER — TELEPHONE (OUTPATIENT)
Dept: FAMILY MEDICINE CLINIC | Facility: CLINIC | Age: 77
End: 2017-05-22

## 2017-05-22 RX ORDER — SULFAMETHOXAZOLE AND TRIMETHOPRIM 800; 160 MG/1; MG/1
1 TABLET ORAL 2 TIMES DAILY
Qty: 14 TABLET | Refills: 0 | Status: SHIPPED | OUTPATIENT
Start: 2017-05-22 | End: 2017-05-29

## 2017-05-22 NOTE — TELEPHONE ENCOUNTER
----- Message from Saskia Reyna MD sent at 5/21/2017  6:59 AM CDT -----  Urine culture grew E. Coli.     Unfortunately, the sensitivity pattern shows resistance to cephalexin - if patient is still having symptoms, then we should switch to a different an

## 2017-05-22 NOTE — TELEPHONE ENCOUNTER
Patient informed of recent urine culture results. Patient states she is not currently having any urinary symptoms.     Recommended that if she is going to continue with the cephalexin to wait 3 days after completing the antibiotic and make a nurse appoi

## 2017-05-22 NOTE — TELEPHONE ENCOUNTER
Patient states that she discussed the results with her  and decided to change the antibiotic to the bactrim ds. She states she would like the script sent [facility] on [address].     Patient states that she understands to have her urine rechecked after

## 2017-06-03 ENCOUNTER — TELEPHONE (OUTPATIENT)
Dept: FAMILY MEDICINE CLINIC | Facility: CLINIC | Age: 77
End: 2017-06-03

## 2017-06-03 ENCOUNTER — APPOINTMENT (OUTPATIENT)
Dept: LAB | Age: 77
End: 2017-06-03
Attending: FAMILY MEDICINE
Payer: MEDICARE

## 2017-06-03 DIAGNOSIS — N30.00 ACUTE CYSTITIS WITHOUT HEMATURIA: Primary | ICD-10-CM

## 2017-06-03 DIAGNOSIS — E03.9 HYPOTHYROIDISM (ACQUIRED): ICD-10-CM

## 2017-06-03 PROCEDURE — 84480 ASSAY TRIIODOTHYRONINE (T3): CPT

## 2017-06-03 PROCEDURE — 84443 ASSAY THYROID STIM HORMONE: CPT

## 2017-06-03 PROCEDURE — 36415 COLL VENOUS BLD VENIPUNCTURE: CPT

## 2017-06-03 NOTE — TELEPHONE ENCOUNTER
Patient having labs drawn today at 11:00 am  No urine ordered  Ok to add order for urine to f/up on UTI?     Abiola Washington, 06/03/2017, 9:52 AM

## 2017-06-05 ENCOUNTER — TELEPHONE (OUTPATIENT)
Dept: FAMILY MEDICINE CLINIC | Facility: CLINIC | Age: 77
End: 2017-06-05

## 2017-06-05 NOTE — TELEPHONE ENCOUNTER
----- Message from Chalino Peacock MD sent at 6/5/2017  9:26 AM CDT -----  This TSH is better.    Will leave dosing alone for now as patient is having such a difficult time with her anxieties

## 2017-06-05 NOTE — TELEPHONE ENCOUNTER
Patient notified of results and recommendations and expressed understanding.     Loretta Washington, 06/05/2017, 10:00 AM

## 2017-06-09 RX ORDER — BUSPIRONE HYDROCHLORIDE 10 MG/1
TABLET ORAL
Qty: 90 TABLET | Refills: 1 | Status: SHIPPED | OUTPATIENT
Start: 2017-06-09 | End: 2017-08-03

## 2017-06-09 NOTE — TELEPHONE ENCOUNTER
5/12/17 last OV with Dr. Yogi Collins  1/30/17 last CMP    [facility], 06/09/2017, 3:18 PM

## 2017-06-19 RX ORDER — CLONAZEPAM 0.5 MG/1
0.25 TABLET ORAL 2 TIMES DAILY PRN
Qty: 30 TABLET | Refills: 0 | Status: SHIPPED
Start: 2017-06-19

## 2017-06-20 ENCOUNTER — TELEPHONE (OUTPATIENT)
Dept: FAMILY MEDICINE CLINIC | Facility: CLINIC | Age: 77
End: 2017-06-20

## 2017-06-20 NOTE — TELEPHONE ENCOUNTER
Script for patient's clonazepam has been faxed to [facility] multiple times  They have not received script  Script called in verbally to [facility]  See refill note 6/19/17    Inna Washington, 06/20/2017, 4:36 PM

## 2017-08-03 RX ORDER — BUSPIRONE HYDROCHLORIDE 10 MG/1
TABLET ORAL
Qty: 90 TABLET | Refills: 3 | Status: SHIPPED | OUTPATIENT
Start: 2017-08-03 | End: 2018-01-19

## 2017-08-03 NOTE — TELEPHONE ENCOUNTER
5/12/17 last OV with Dr. Jessica Haley  Several labs done since starting Epic    Marisel Washington, 08/03/17, 9:16 AM

## 2017-08-04 ENCOUNTER — TELEPHONE (OUTPATIENT)
Dept: FAMILY MEDICINE CLINIC | Facility: CLINIC | Age: 77
End: 2017-08-04

## 2018-01-19 RX ORDER — BUSPIRONE HYDROCHLORIDE 10 MG/1
TABLET ORAL
Qty: 90 TABLET | Refills: 0 | Status: SHIPPED | OUTPATIENT
Start: 2018-01-19

## 2018-01-19 NOTE — TELEPHONE ENCOUNTER
Patient is active on [employer]  Message sent to patient on [facility], 01/19/18, 2:40 PM

## 2018-01-19 NOTE — TELEPHONE ENCOUNTER
Future appt:    Last Appointment:  5/12/17 with Dr. Sonya Liz for thyroid  No indication of return date      No results found for: CHOLEST, HDL, LDL, TRIGLY, TRIG  No results found for: EAG, A1C    Lab Results  Component Value Date   T4F 0.9 01/30/2017   TSH 2.

## 2018-01-19 NOTE — TELEPHONE ENCOUNTER
Dr. Melanie Srinivasan is out of the office today. Refill done. Please remind patient that she is due for recheck in a couple months. Needs to schedule soon.    Sophie Oquendo, 01/19/18, 2:19 PM

## 2018-09-13 ENCOUNTER — PATIENT OUTREACH (OUTPATIENT)
Dept: CASE MANAGEMENT | Age: 78
End: 2018-09-13

## 2018-09-13 ENCOUNTER — TELEPHONE (OUTPATIENT)
Dept: FAMILY MEDICINE CLINIC | Facility: CLINIC | Age: 78
End: 2018-09-13

## 2018-09-13 NOTE — PROGRESS NOTES
Spoke with pt today for condition update. Pt states she no longer is seeing Louis Skinner as her PCP. Pt states she now see's a PCP with Beny. Advised pt would let PCP office know to remove. Pt verbalizes understanding and thanked me for the call today.

## 2018-09-13 NOTE — TELEPHONE ENCOUNTER
NCM spoke with pt today for condition update. Pt states she no longer is seeing Reagan Del Toro as her PCP. Pt states she now see's a PCP with Beny. Advised pt would let PCP office know of change in PCP.  Pt verbalizes understanding and thanked me for the call t

## 2019-03-28 ENCOUNTER — TELEPHONE (OUTPATIENT)
Dept: FAMILY MEDICINE CLINIC | Facility: CLINIC | Age: 79
End: 2019-03-28

## 2019-05-17 ENCOUNTER — TELEPHONE (OUTPATIENT)
Dept: FAMILY MEDICINE CLINIC | Facility: CLINIC | Age: 79
End: 2019-05-17

## 2019-05-17 NOTE — TELEPHONE ENCOUNTER
Patient switched PCP to Dr Nyla Dickerson // she will call insurance Co to change PCP - gave her phone #    due to recent hospitalizations

## 2019-06-04 ENCOUNTER — TELEPHONE (OUTPATIENT)
Dept: FAMILY MEDICINE CLINIC | Facility: CLINIC | Age: 79
End: 2019-06-04

## 2019-06-04 ENCOUNTER — SLEEP STUDY (OUTPATIENT)
Dept: FAMILY MEDICINE CLINIC | Facility: CLINIC | Age: 79
End: 2019-06-04
Payer: COMMERCIAL

## 2019-06-04 DIAGNOSIS — G47.33 OSA (OBSTRUCTIVE SLEEP APNEA): Primary | ICD-10-CM

## 2019-06-04 PROCEDURE — 95811 POLYSOM 6/>YRS CPAP 4/> PARM: CPT | Performed by: FAMILY MEDICINE

## 2019-06-04 NOTE — TELEPHONE ENCOUNTER
Pt stated that she would prefer to f/u with Dr. Johnston Hidden at this time. Sleep study entered into flowsheet.

## 2022-11-02 NOTE — MR AVS SNAPSHOT
[address]  [place] [id number]  [phone]               Thank you for choosing us for your health care visit with Tessy Skelton MD.  We are glad to serve you and happy to provide you with this summary of Yes thanks * Standard THYROID 60 MG Tabs   Generic drug:  thyroid   Take 1 tablet by mouth daily. Taken along with 30 mg dose of armour thyroid           aspirin 81 MG Tabs   Take 81 mg by mouth daily.            BusPIRone HCl 10 MG Tabs   TAKE 1 TABLET (10 MG TOTAL) B view more details from this visit by going to [email or web address]. If you've recently had a stay at the Hospital you can access your discharge instructions in [employer] by going to Visits < Admission Summaries.  If you've been to the Emergency Depar

## (undated) NOTE — MR AVS SNAPSHOT
[address] [place]  [place] [address] [id number]  [phone]               Thank you for choosing us for your health care visit with NEELA Garcia.   We are glad to serve you and happy to provide you with this summary o Levofloxacin Nausea only    Other reaction(s):  Other (See Comments)  Feet itched, constipated    Moxifloxacin Nausea only    Macrobid  [Nitrofurantoin] Nausea only, Nausea and vomiting    Risedronate Diarrhea                Today's Vital Signs     BP Puls - cephALEXin 500 MG Caps            Results of Recent Testing     URINALYSIS, AUTO, W/O SCOPE      Component Value Standard Range & Units    GLUCOSE (URINE DIPSTICK) negative Negative mg/dL    BILIRUBIN negative Negative    KETONES (URINE DIPSTICK) negati dairy products with reduced content of saturated and total fat.    Dietary sodium reduction Reduce dietary sodium intake to <= 100 mmol per day (2.4 g sodium or 6 g sodium chloride)   Aerobic physical activity Regular aerobic physical activity (e.g., brisk

## (undated) NOTE — LETTER
12/18/17        [address] [address]      Dear Fara Cooper,    [facility] records indicate that you have outstanding lab work and or testing that was ordered for you and has not yet been completed:          T4 FREE [E] (Free Thyroxin

## (undated) NOTE — MR AVS SNAPSHOT
[address] [place]  [place] [address] [id number]  [phone]               Thank you for choosing us for your health care visit with Natalie Pavon MD.  We are glad to serve you and happy to provide you with this summary of yo Generic drug:  thyroid   Take 1 tablet by mouth every other day. * MOSHE THYROID 60 MG Tabs   Generic drug:  thyroid   Take 1 tablet by mouth daily.  Taken along with 30 mg dose of armour thyroid           aspirin 81 MG Tabs   Take 81 mg by mouth

## (undated) NOTE — MR AVS SNAPSHOT
[address] [place]  [place] [address] [phone]               Thank you for choosing us for your health care visit with NEELA Alcala.   We are glad to serve you and happy to provide you with this summary of yo 138/76 mmHg 100 97.9 °F (36.6 °C) (Temporal) 68\" 165 lb 9.6 oz 25.19 kg/m2         Current Medications          This list is accurate as of: 4/11/17  5:20 PM.  Always use your most recent med list.                ACIDOPHILUS PROBIOTIC 100 MG Caps information, go to [email or web address] and click on the Sign Up Now link in the [employer] box. Enter your [employer] Activation Code exactly as it appears below along with your Zip Code and Date of Birth to complete the sign-up process.  If you do chair/bench and a hand held shower head while bathing/showering. BEDROOM:  ? Place light switches within reach of your bed and a night light between the bedroom and bathroom. ? Get up slowly from lying down or sitting if you get dizzy. ?  Keep a working

## (undated) NOTE — MR AVS SNAPSHOT
[address] [place]  [place] [address] [phone]  [phone]               Thank you for choosing us for your health care visit with Stefan Hoffman MD.  We are glad to serve you and happy to provide you with this summary of yo [address] [facility] [address]   [address] [facility],[address] [address]   Phone:  [phone]       Comment:  \"Treatment: Evaluate & Treat    Physician goals: Pain relief    Region Specific: Shoulder    Rehab potential: Good Augmentin [Amoxicillin-Pot Clavulanate] Diarrhea    Levofloxacin Nausea only    Other reaction(s):  Other (See Comments)  Feet itched, constipated    Moxifloxacin Nausea only    Macrobid  [Nitrofurantoin] Nausea only, Nausea and vomiting    Risedronate Marya Phone:  [phone]    - PARoxetine HCl 10 MG Tabs            [employer]     Sign up for [employer], your secure online medical record. [employer] will allow you to access patient instructions from your recent visit,  view other health information, and more.  To ? Keep furniture in its accustomed place. ? If you have pets, be careful that you don’t trip over them. OUTSIDE SAFETY TIPS  ? Always wear good shoes with proper support and traction. ? Always use hand rails on stairs and escalators.   ? Cover porch s priority on exercise in your life                    Visit [facility] online at  [email or web address]

## (undated) NOTE — MR AVS SNAPSHOT
[address] [place]  [place] [address] [phone]               Thank you for choosing us for your health care visit with Nu Escoto MD.  We are glad to serve you and happy to provide you with this summary of yo Today's Vital Signs     BP Pulse Temp Height Weight BMI    130/72 mmHg 96 97.3 °F (36.3 °C) 68\" 166 lb 12.8 oz 25.37 kg/m2         Current Medications          This list is accurate as of: 1/30/17 12:41 PM.  Always use your most recent med list. sciatica [M54.5], Hypothyroidism, unspecified type [E03.9], Other depression [F32.89]           CMP    Complete by:  Jan 30, 2017 (Approximate)    Assoc Dx:  Vasovagal syncope [R55], Acute bilateral low back pain without sciatica [M54.5], Hypothyroidism, u not sign up before the expiration date, you must request a new code. Your unique [employer] Access Code: [email or web address]  Expires: 3/31/2017 12:41 PM    If you have questions, you can call [phone] to talk to our [facility] Staff.  Remember, [employer]

## (undated) NOTE — MR AVS SNAPSHOT
[address] [place]  [place] [address] [id number] [phone]               Thank you for choosing us for your health care visit with Domenico Cutler MD.  We are glad to serve you and happy to provide you with this summary of yo aspirin 81 MG Tabs   Take 81 mg by mouth daily. BusPIRone HCl 10 MG Tabs   Take 1 tablet (10 mg total) by mouth 3 (three) times daily.    Commonly known as:  BUSPAR           * cephALEXin 500 MG Caps   Take 1 capsule (500 mg total) by mouth 3 (th Sign up for [employer], your secure online medical record. [employer] will allow you to access patient instructions from your recent visit,  view other health information, and more. To sign up or find more information, go to [email or web address] and cl